# Patient Record
Sex: MALE
[De-identification: names, ages, dates, MRNs, and addresses within clinical notes are randomized per-mention and may not be internally consistent; named-entity substitution may affect disease eponyms.]

---

## 2018-11-13 ENCOUNTER — HOSPITAL ENCOUNTER (OUTPATIENT)
Dept: HOSPITAL 89 - LAB | Age: 78
End: 2018-11-13
Attending: INTERNAL MEDICINE
Payer: COMMERCIAL

## 2018-11-13 DIAGNOSIS — Z12.5: Primary | ICD-10-CM

## 2018-11-13 DIAGNOSIS — E53.8: ICD-10-CM

## 2018-11-13 DIAGNOSIS — I25.10: ICD-10-CM

## 2018-11-13 DIAGNOSIS — R41.3: ICD-10-CM

## 2018-11-13 LAB
LDLC SERPL-MCNC: 47 MG/DL
PLATELET COUNT, AUTOMATED: 139 K/UL (ref 150–450)

## 2018-11-13 PROCEDURE — 84520 ASSAY OF UREA NITROGEN: CPT

## 2018-11-13 PROCEDURE — 82374 ASSAY BLOOD CARBON DIOXIDE: CPT

## 2018-11-13 PROCEDURE — 84155 ASSAY OF PROTEIN SERUM: CPT

## 2018-11-13 PROCEDURE — 84075 ASSAY ALKALINE PHOSPHATASE: CPT

## 2018-11-13 PROCEDURE — 82947 ASSAY GLUCOSE BLOOD QUANT: CPT

## 2018-11-13 PROCEDURE — 84153 ASSAY OF PSA TOTAL: CPT

## 2018-11-13 PROCEDURE — 36415 COLL VENOUS BLD VENIPUNCTURE: CPT

## 2018-11-13 PROCEDURE — 82247 BILIRUBIN TOTAL: CPT

## 2018-11-13 PROCEDURE — 84478 ASSAY OF TRIGLYCERIDES: CPT

## 2018-11-13 PROCEDURE — 82565 ASSAY OF CREATININE: CPT

## 2018-11-13 PROCEDURE — 82607 VITAMIN B-12: CPT

## 2018-11-13 PROCEDURE — 82465 ASSAY BLD/SERUM CHOLESTEROL: CPT

## 2018-11-13 PROCEDURE — 84295 ASSAY OF SERUM SODIUM: CPT

## 2018-11-13 PROCEDURE — 83718 ASSAY OF LIPOPROTEIN: CPT

## 2018-11-13 PROCEDURE — 82310 ASSAY OF CALCIUM: CPT

## 2018-11-13 PROCEDURE — 84460 ALANINE AMINO (ALT) (SGPT): CPT

## 2018-11-13 PROCEDURE — 84132 ASSAY OF SERUM POTASSIUM: CPT

## 2018-11-13 PROCEDURE — 82435 ASSAY OF BLOOD CHLORIDE: CPT

## 2018-11-13 PROCEDURE — 84450 TRANSFERASE (AST) (SGOT): CPT

## 2018-11-13 PROCEDURE — 85025 COMPLETE CBC W/AUTO DIFF WBC: CPT

## 2018-11-13 PROCEDURE — 82040 ASSAY OF SERUM ALBUMIN: CPT

## 2018-11-13 PROCEDURE — 84443 ASSAY THYROID STIM HORMONE: CPT

## 2018-12-17 VITALS — SYSTOLIC BLOOD PRESSURE: 157 MMHG | DIASTOLIC BLOOD PRESSURE: 69 MMHG

## 2018-12-18 NOTE — [facility] ONCOLOGY NOTE
EVENT DATE:  2018 



CHIEF COMPLAINT/REASON FOR VISIT 

Mr. Lance is a pleasant 78-year-old gentleman with mild macrocytic anemia and 

mild thrombocytopenia, who presents for consultation.  



HISTORY OF PRESENT ILLNESS 

[place] presents.  He feels extremely well.  He feels that he is at his 

baseline.  No fatigue, exercise intolerance, weight loss, concerning lumps or 

bumps or other symptoms of concern.  No frequent infections.  He is looking 

forward to traveling back to [place] for the holidays, but has loved living

in [place].  He moved here about three months ago to get away from the busy 

[place]  He has been extremely happy here.  



His PSA continues to be high at 7, and he does take finasteride.  We do need to 

follow this, and it may warrant a urology consultation.  Regardless, he should 

have followup PSAs done.  His CBCs have been done, and they show mild 

abnormalities in two cell lines, and so it is very appropriate to do a workup.  

His B12 has been low in the past but was normal now, and he is on oral 

replacement.  His thyroid level was normal as well.  These are appropriate first

steps for workup of macrocytic anemia, but we plan to expand it with some 

additional labs.  I am concerned that he may have a very low-grade MDS.  



MEDICATION LIST 

1.  Simvastatin 40 mg.  

2.  Finasteride 5 mg.  

3.  Flonase twice daily.  

4.  B12 1000 mcg daily.  

5.  Multivitamin.  

6.  Meclizine as needed.  

7.  Omeprazole 20 mg daily.  

8.  Aspirin 81 mg daily.  



PAST MEDICAL HISTORY 

1.  Osteoarthritis.  

2.  Cataracts.  

3.  GERD.  

4.  History of heart murmur.  

5.  Hypertension.  

6.  Hypercholesterolemia.  

7.  BPH.  



PAST SURGICAL HISTORY 

1.  Coronary artery bypass in .  

2.  Hernia repair approximately four years ago.  



ALLERGIES

No known drug allergies.  



FAMILY HISTORY 

Remarkable for heart disease in the family.  



SOCIAL HISTORY 

Retired.  He has two children, aged 55 and 56.  Unfortunately, he is .  

No recreational drug use, tobacco or alcohol use.  



REVIEW OF SYSTEMS 

CONSTITUTIONAL:  No fevers, chills, or weight change.  

HEENT:  No headache or vision changes.  

CARDIOVASCULAR:  No chest pain, dyspnea on exertion, or edema.  

RESPIRATORY:  No shortness of breath, wheeze, or cough.  

GASTROINTESTINAL:  No nausea or vomiting.  

GENITOURINARY:  No dysuria or hematuria.  

MUSCULOSKELETAL:  No weakness or joint pains.  

PSYCHIATRIC: No anxiety or depression.  

ENDOCRINE: No heat or cold intolerance.  

SKIN:  No concerning findings.  

Remainder of 14-point review of systems is otherwise negative.  



PHYSICAL EXAMINATION 

VITAL SIGNS:  Blood pressure 151/69, pulse 61, respiratory rate 16, temperature 

96.5 Fahrenheit, oxygen saturation 95% on room air.  Weight 65.5 kg, pain 0/10, 

fatigue 0/10.  

GENERAL:  In stable condition, resting comfortably in the chair.  

CARDIOVASCULAR:  Regular rate and rhythm.  I do not appreciate a murmur today, 

although he does have a documented history of one.  

LUNGS: Clear to auscultation bilaterally.  

ABDOMEN:  Soft, nontender.  No organomegaly or masses.  

EXTREMITIES:  No clubbing, cyanosis, or edema.  

SKIN:  No concerning findings.  

Remainder of physical exam otherwise otherwise unremarkable.  



IMPRESSION/REPORT/PLAN 

Mr. Lance is a pleasant 78-year-old gentleman with the followin.  Macrocytic anemia.  

2.  Mild thrombocytopenia.  

3.  Elevated PSA.  



I had a detailed discussion about the diagnosis, possible treatment options for 

mild macrocytic anemia with thrombocytopenia.  I discussed the possibility of 

myelodysplastic syndrome, which is a cancer.  He would not require any treatment

at this time, though, so I do not feel a bone marrow biopsy is required.  I 

believe we need to expand his lab workup and follow him every three months with 

labs.  He agrees to this.  I did repeat many issues, as there was some 

misunderstanding about what we can or cannot do for myelodysplastic syndrome.  I

believe he understands that I am concerned about the possibility of this 

diagnosis but do not believe an aggressive workup is warranted, because no 

therapy would be indicated at this time, and it would not change his survival or

the natural history of this disease with early detection/early treatment.  



The most important thing is to be checking his labs approximately three months 

to get a good trend.  I have answered all of his many questions today.  We will 

see him back in three months.  



Dr. Weber, thank you so much for the consultation on this very kind patient of 

yours.  



BILLING

New patient, level 4.  

Total time 45 minutes, counseling time 30.  

MTDD

## 2018-12-21 VITALS — SYSTOLIC BLOOD PRESSURE: 153 MMHG | DIASTOLIC BLOOD PRESSURE: 72 MMHG

## 2018-12-21 LAB — PLATELET COUNT, AUTOMATED: 142 K/UL (ref 150–450)

## 2019-03-04 ENCOUNTER — HOSPITAL ENCOUNTER (OUTPATIENT)
Dept: HOSPITAL 89 - ONC | Age: 79
LOS: 13 days | End: 2019-03-17
Attending: INTERNAL MEDICINE
Payer: MEDICARE

## 2019-03-04 VITALS — DIASTOLIC BLOOD PRESSURE: 74 MMHG | SYSTOLIC BLOOD PRESSURE: 173 MMHG

## 2019-03-04 DIAGNOSIS — Z79.82: ICD-10-CM

## 2019-03-04 DIAGNOSIS — E78.00: ICD-10-CM

## 2019-03-04 DIAGNOSIS — R97.20: ICD-10-CM

## 2019-03-04 DIAGNOSIS — I10: ICD-10-CM

## 2019-03-04 DIAGNOSIS — D50.9: Primary | ICD-10-CM

## 2019-03-04 DIAGNOSIS — M19.90: ICD-10-CM

## 2019-03-04 DIAGNOSIS — K21.9: ICD-10-CM

## 2019-03-04 DIAGNOSIS — Z95.1: ICD-10-CM

## 2019-03-04 DIAGNOSIS — H26.9: ICD-10-CM

## 2019-03-04 DIAGNOSIS — D69.6: ICD-10-CM

## 2019-03-04 DIAGNOSIS — Z86.79: ICD-10-CM

## 2019-03-04 PROCEDURE — 88275 CYTOGENETICS 100-300: CPT

## 2019-03-04 PROCEDURE — 84165 PROTEIN E-PHORESIS SERUM: CPT

## 2019-03-04 PROCEDURE — 85025 COMPLETE CBC W/AUTO DIFF WBC: CPT

## 2019-03-04 PROCEDURE — 82746 ASSAY OF FOLIC ACID SERUM: CPT

## 2019-03-04 PROCEDURE — 99202 OFFICE O/P NEW SF 15 MIN: CPT

## 2019-03-04 PROCEDURE — 88291 CYTO/MOLECULAR REPORT: CPT

## 2019-03-04 PROCEDURE — 99212 OFFICE O/P EST SF 10 MIN: CPT

## 2019-03-04 PROCEDURE — 82728 ASSAY OF FERRITIN: CPT

## 2019-03-04 PROCEDURE — 36415 COLL VENOUS BLD VENIPUNCTURE: CPT

## 2019-03-04 PROCEDURE — 83615 LACTATE (LD) (LDH) ENZYME: CPT

## 2019-03-04 PROCEDURE — 88271 CYTOGENETICS DNA PROBE: CPT

## 2019-03-05 NOTE — ONCOLOGY FOLLOW UP NOTE
EVENT DATE: 2019 



CHIEF COMPLAINT/REASON FOR VISIT 

Mr. Lance is a 78-year-old gentleman with new diagnosis of MDS with 5q deletion, 

who presents for followup.  



HISTORY OF PRESENT ILLNESS 

Israel returns.  He continues to feel well.  He is frustrated with his 

insurance company's current refusal to pay for standard labs for MDS.  As noted 

in my prior note from , he had macrocytic anemia and 

thrombocytopenia, which is classic for early MDS in a 78-year-old.  We chose not

to do a bone marrow biopsy but a lab evaluation, and proved his diagnosis of MDS

with a 5q deletion.  Our team is continuing to work with his insurance to get 

this covered.  I am more than happy to do a [id number] for this standard work.

 



He continues to have an elevated PSA, and would like him to follow up with 

Urology.  



He did not get labs prior to the visit because of the insurance issues.  He is 

concerned that each cost and lab is costing him a lot of money, and until 

insurance agrees to pay, he is going to refuse to pursue any other labs or 

followups.  He states he will call if he has any symptoms.  I think this is 

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 

followup to catch it at a treatable level before it is in crisis.  I am very 

worried that we will next hear from Israel again when he is in a crisis.  



MEDICATION LIST 

1.  Simvastatin 40 mg.  

2.  Finasteride 5 mg.  

3.  Flonase twice daily.  

4.  B12 1000 mcg daily.  

5.  Multivitamin.  

6.  Meclizine as needed.  

7.  Omeprazole 20 mg daily.  

8.  Aspirin 81 mg daily.  



PAST MEDICAL HISTORY 

1.  Osteoarthritis.  

2.  Cataracts.  

3.  GERD.  

4.  History of heart murmur.  

5.  Hypertension.  

6.  Hypercholesterolemia.  

7.  BPH.  

8.  MDS with 5q deletion.  



PAST SURGICAL HISTORY 

1.  Coronary artery bypass in .  

2.  Hernia repair approximately four years ago.  



ALLERGIES

No known drug allergies.  



FAMILY HISTORY 

Remarkable for heart disease in the family.  



SOCIAL HISTORY 

Retired.  He has two children, aged 55 and 56.  Unfortunately, he is .  

No recreational drug use, tobacco or alcohol use.  



REVIEW OF SYSTEMS 

CONSTITUTIONAL:  No fevers, chills, or weight change.  

HEENT:  No headache or vision changes.  

CARDIOVASCULAR:  No chest pain, dyspnea on exertion, or edema.  

RESPIRATORY:  No shortness of breath, wheeze, or cough.  

GASTROINTESTINAL:  No nausea or vomiting.  

GENITOURINARY:  No dysuria or hematuria.  

MUSCULOSKELETAL:  No weakness or joint pains.  

PSYCHIATRIC: No anxiety or depression.  

ENDOCRINE: No heat or cold intolerance.  

SKIN:  No concerning findings.  

Remainder of 14-point review of systems is otherwise negative.  



PHYSICAL EXAMINATION 

VITAL SIGNS:  Blood pressure 173/74, pulse 63, respiratory rate 16, temperature 

97.2 Fahrenheit, oxygen saturation 94% on room air.  Weight 67.4 kg, pain 0/10, 

fatigue 0/10.  

GENERAL:  In stable condition, resting comfortably in the chair.  

HEENT:  Normocephalic, atraumatic.  

CARDIOVASCULAR:  Regular rate and rhythm.  

LUNGS: Clear.  

ABDOMEN:  Soft.  No organomegaly or masses.  I do not feel his spleen.  

LYMPHATIC:  Unremarkable.  

Remainder of physical exam unremarkable.  



IMPRESSION/REPORT/PLAN 

Mr. Lance is a pleasant 78-year-old gentleman with the followin.  Myelodysplastic syndrome with 5q deletion.  He also has macrocytic anemia 

and mild thrombocytopenia.  He is refusing to get followup labs due to insurance

issues.  

2.  Elevated PSA.  His PSA has been 7 in the past.  We would like to follow this

as well.  



I had a discussion about the diagnosis, treatment options and natural history of

myelodysplastic syndrome.  There is a low probability of progression to leukemia

on an annual basis, but he is definitely at risk for this.  MDS can offer 

require treatment too.  Given the 5q deletion, we could utilize Revlimid and/or 

erythropoietin.  He is discouraged by the news of a cancer as the cause of his 

diagnosis.  He is still quite frustrated with the insurance issues.  We will 

continue to work on them and appeal them.  



I am hopeful that with coverage of his labs in the future, we may be able to 

convince him to do the standard every-three-month lab and followup evaluation.  

At this time, he would like to move forward without labs and annual visits.  He 

states he will call if he notices any symptoms.  Would encourage urology consult

as well in the future.  



BILLING 

Return visit, level 4.  

Total time 30 minutes, counseling time 25.  

MTDD

## 2019-08-16 ENCOUNTER — HOSPITAL ENCOUNTER (OUTPATIENT)
Dept: HOSPITAL 89 - LAB | Age: 79
End: 2019-08-16
Attending: INTERNAL MEDICINE
Payer: COMMERCIAL

## 2019-08-16 DIAGNOSIS — E78.5: ICD-10-CM

## 2019-08-16 DIAGNOSIS — Z12.5: Primary | ICD-10-CM

## 2019-08-16 DIAGNOSIS — I10: ICD-10-CM

## 2019-08-16 LAB
LDLC SERPL-MCNC: 77 MG/DL
PLATELET COUNT, AUTOMATED: 140 K/UL (ref 150–450)

## 2019-08-16 PROCEDURE — 36415 COLL VENOUS BLD VENIPUNCTURE: CPT

## 2019-08-16 PROCEDURE — 82247 BILIRUBIN TOTAL: CPT

## 2019-08-16 PROCEDURE — 82435 ASSAY OF BLOOD CHLORIDE: CPT

## 2019-08-16 PROCEDURE — 84520 ASSAY OF UREA NITROGEN: CPT

## 2019-08-16 PROCEDURE — 82310 ASSAY OF CALCIUM: CPT

## 2019-08-16 PROCEDURE — 82465 ASSAY BLD/SERUM CHOLESTEROL: CPT

## 2019-08-16 PROCEDURE — 82565 ASSAY OF CREATININE: CPT

## 2019-08-16 PROCEDURE — 85025 COMPLETE CBC W/AUTO DIFF WBC: CPT

## 2019-08-16 PROCEDURE — 84450 TRANSFERASE (AST) (SGOT): CPT

## 2019-08-16 PROCEDURE — 84295 ASSAY OF SERUM SODIUM: CPT

## 2019-08-16 PROCEDURE — 84155 ASSAY OF PROTEIN SERUM: CPT

## 2019-08-16 PROCEDURE — 84478 ASSAY OF TRIGLYCERIDES: CPT

## 2019-08-16 PROCEDURE — 84153 ASSAY OF PSA TOTAL: CPT

## 2019-08-16 PROCEDURE — 84132 ASSAY OF SERUM POTASSIUM: CPT

## 2019-08-16 PROCEDURE — 82040 ASSAY OF SERUM ALBUMIN: CPT

## 2019-08-16 PROCEDURE — 84460 ALANINE AMINO (ALT) (SGPT): CPT

## 2019-08-16 PROCEDURE — 82374 ASSAY BLOOD CARBON DIOXIDE: CPT

## 2019-08-16 PROCEDURE — 83718 ASSAY OF LIPOPROTEIN: CPT

## 2019-08-16 PROCEDURE — 82947 ASSAY GLUCOSE BLOOD QUANT: CPT

## 2019-08-16 PROCEDURE — 84075 ASSAY ALKALINE PHOSPHATASE: CPT
